# Patient Record
Sex: FEMALE
[De-identification: names, ages, dates, MRNs, and addresses within clinical notes are randomized per-mention and may not be internally consistent; named-entity substitution may affect disease eponyms.]

---

## 2022-06-02 ENCOUNTER — HOSPITAL ENCOUNTER (OUTPATIENT)
Dept: HOSPITAL 5 - MAMMO | Age: 42
Discharge: HOME | End: 2022-06-02
Attending: INTERNAL MEDICINE
Payer: COMMERCIAL

## 2022-06-02 DIAGNOSIS — N60.02: Primary | ICD-10-CM

## 2022-06-02 DIAGNOSIS — N60.01: ICD-10-CM

## 2022-06-02 DIAGNOSIS — N64.89: ICD-10-CM

## 2022-06-02 PROCEDURE — 77066 DX MAMMO INCL CAD BI: CPT

## 2022-06-02 NOTE — MAMMOGRAPHY REPORT
BILATERAL DIGITAL DIAGNOSTIC MAMMOGRAM WITH CAD , 6/2/2022

BILATERAL LIMITED BREAST ULTRASOUND

 

CLINICAL INFORMATION / INDICATION: 42-year-old female who presents for evaluation of bilateral palpab
le breast lumps and bilateral breast pain.



TECHNIQUE: Digital bilateral mammographic imaging was performed. Spot compression views were obtained
. Limited ultrasound was performed. This examination was interpreted with the benefit of Computer-Aid
ed Detection (CAD) analysis. 



COMPARISON: This is the patient's first mammogram.



FINDINGS: 



Breast Density: The breasts are heterogeneously dense, which may obscure small masses.



MAMMOGRAPHIC FINDINGS: No dominant mass, suspicious calcifications, or architectural distortion in th
e left breast. There is focal asymmetry in the upper outer quadrant of the right breast which will be
 further evaluated with ultrasound. Additional spot compression imaging of the left breast was perfor
med which did not show any persistent abnormality.



ULTRASOUND FINDINGS: Targeted ultrasound evaluation was performed of the area of interest.   

Right breast: Several small cysts are seen throughout the right breast. The largest cyst is noted at 
11:00, 8 cm from nipple, measuring 1.7 cm in diameter. The large cysts corresponds to the general are
a of pain as directed by the patient.

Additionally there is a hypoechoic oval solid mass at 10:00, 6 cm from nipple measuring 1.0 x 1.0 x 0
.5 cm. The appearance is most suggestive of a fibroadenoma. Evaluation of the axilla demonstrated damian
e morphologically normal-appearing lymph nodes.



Left breast: In the palpable area of the left breast, there is a focal area of fibrocystic tissue at 
1:00, 6 cm from nipple. Additionally there is a simple cyst at 3:00, 3 cm from nipple measuring 7 mm.




IMPRESSION: Probably benign findings.

1. Right breast: Multiple small cysts are seen scattered throughout the right breast and appear to ac
count for the palpable area and area of pain.

2. Right breast: There is an incidental highly likely benign solid mass at 10:00 which will require 6
 month follow-up right breast ultrasound to establish stability.

3. Left breast: Fibrocystic tissue is seen in the lateral left breast accounting for area of palpable
 abnormality.



Follow up recommendation: Short term follow up in 6 months.



BI-RADS Category 3:  PROBABLY BENIGN. Followup in 6 months.



-------------------------------------------------------------------------------------------

A "normal" or negative report should not discourage follow up or biopsy of a clinically significant f
inding.



A written summary of these findings will be mailed to the patient. The patient will be entered into a
 mammography reporting system which will generate a reminder letter for the patient's next appointmen
t at the appropriate interval.



According to the [employer], yearly mammograms are recommended starting at age 40 
and continuing as long as a woman is in good health.  Breast MRI is recommended for women with an mariia
roximately 20-25% or greater lifetime risk of breast cancer, including women with a strong family his
tory of breast or ovarian cancer and women who have been treated for Hodgkin's disease.



Signer Name: Rosita Lopez MD 

Signed: 6/2/2022 11:51 AM

Workstation Name: [email or web address]

## 2022-06-02 NOTE — ULTRASOUND REPORT
BILATERAL DIGITAL DIAGNOSTIC MAMMOGRAM WITH CAD , 6/2/2022

BILATERAL LIMITED BREAST ULTRASOUND

 

CLINICAL INFORMATION / INDICATION: 42-year-old female who presents for evaluation of bilateral palpab
le breast lumps and bilateral breast pain.



TECHNIQUE: Digital bilateral mammographic imaging was performed. Spot compression views were obtained
. Limited ultrasound was performed. This examination was interpreted with the benefit of Computer-Aid
ed Detection (CAD) analysis. 



COMPARISON: This is the patient's first mammogram.



FINDINGS: 



Breast Density: The breasts are heterogeneously dense, which may obscure small masses.



MAMMOGRAPHIC FINDINGS: No dominant mass, suspicious calcifications, or architectural distortion in th
e left breast. There is focal asymmetry in the upper outer quadrant of the right breast which will be
 further evaluated with ultrasound. Additional spot compression imaging of the left breast was perfor
med which did not show any persistent abnormality.



ULTRASOUND FINDINGS: Targeted ultrasound evaluation was performed of the area of interest.   

Right breast: Several small cysts are seen throughout the right breast. The largest cyst is noted at 
11:00, 8 cm from nipple, measuring 1.7 cm in diameter. The large cysts corresponds to the general are
a of pain as directed by the patient.

Additionally there is a hypoechoic oval solid mass at 10:00, 6 cm from nipple measuring 1.0 x 1.0 x 0
.5 cm. The appearance is most suggestive of a fibroadenoma. Evaluation of the axilla demonstrated damian
e morphologically normal-appearing lymph nodes.



Left breast: In the palpable area of the left breast, there is a focal area of fibrocystic tissue at 
1:00, 6 cm from nipple. Additionally there is a simple cyst at 3:00, 3 cm from nipple measuring 7 mm.




IMPRESSION: Probably benign findings.

1. Right breast: Multiple small cysts are seen scattered throughout the right breast and appear to ac
count for the palpable area and area of pain.

2. Right breast: There is an incidental highly likely benign solid mass at 10:00 which will require 6
 month follow-up right breast ultrasound to establish stability.

3. Left breast: Fibrocystic tissue is seen in the lateral left breast accounting for area of palpable
 abnormality.



Follow up recommendation: Short term follow up in 6 months.



BI-RADS Category 3:  PROBABLY BENIGN. Followup in 6 months.



-------------------------------------------------------------------------------------------

A "normal" or negative report should not discourage follow up or biopsy of a clinically significant f
inding.



A written summary of these findings will be mailed to the patient. The patient will be entered into a
 mammography reporting system which will generate a reminder letter for the patient's next appointmen
t at the appropriate interval.



According to the [employer], yearly mammograms are recommended starting at age 40 
and continuing as long as a woman is in good health.  Breast MRI is recommended for women with an mariia
roximately 20-25% or greater lifetime risk of breast cancer, including women with a strong family his
tory of breast or ovarian cancer and women who have been treated for Hodgkin's disease.



Signer Name: Rosita Lopez MD 

Signed: 6/2/2022 11:51 AM

Workstation Name: [email or web address]